# Patient Record
(demographics unavailable — no encounter records)

---

## 2025-01-14 NOTE — HISTORY OF PRESENT ILLNESS
[FreeTextEntry1] : overall feels well less active in winter, recent travel to [facility] some cp

## 2025-01-14 NOTE — DISCUSSION/SUMMARY
[FreeTextEntry1] : stable exam CAD/cp- h/o pci, recommend f/u for stress echo evaluation HTN stable on meds, discussed lifestyle Lipids stable on statin

## 2025-03-05 NOTE — HISTORY OF PRESENT ILLNESS
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  symptoms prior to cpap -  Patient is sleepy with [place] sleepiness score of 12.  Patient has very loud snoring which disturbs his  wife, does not have any witnessed apneas.  Patient's bedtime is around 10 PM wakes up in the morning around 4.45 AM.  He  feels rested when he  wakes up.  Patient drinks 3-4 cups of coffee during the daytime. Patient does not have any headaches , has nocturia 2-3 times.  he not is sleepy while driving.  symptoms on the cpap - Patient is not sleepy with [place] sleepiness score of 5.  Patient does not snore with cpap.  Patient's bedtime is around 10 PM wakes up in the morning around 5 AM.  He  feels rested when he  wakes up.  Patient drinks 3 cups of coffee during the daytime. Patient does not have any headaches , his nocturia improved.  He not is sleepy while driving.  download from the cpap on resmed machine dme regine uses nasal mask ahi 0.6 usage 6 hrs 40 min pressure 13 cm (8-15)  He has not seen PMD in a while and with all the medication he is taking, havent done blood work in a while will order cbc, basic met panel, hba1c, thyroid panel.

## 2025-03-05 NOTE — PHYSICAL EXAM
[General Appearance - Well Developed] : well developed [General Appearance - Well Nourished] : well nourished [Elongated Uvula] : elongated uvula [Enlarged Base of the Tongue] : enlargement of the base of the tongue [IV] : IV [Heart Sounds] : normal S1 and S2 [Murmurs] : no murmurs [] : no respiratory distress [Auscultation Breath Sounds / Voice Sounds] : lungs were clear to auscultation bilaterally [No Focal Deficits] : no focal deficits [Oriented To Time, Place, And Person] : oriented to person, place, and time [Memory Recent] : recent memory was not impaired

## 2025-03-05 NOTE — ASSESSMENT
[FreeTextEntry1] : 53 year  old man  with sleep apnea is doing well with the CPAP.  Patient is compliant with the CPAP and benefited significantly with the CPAP. Assessment: Obstructive Sleep Apnea (DOLLY): Well-controlled on CPAP therapy with AHI of 0.6, excellent adherence (6 hr 40 min/night), and significant symptom improvement ([place] score reduced from 12 to 5, resolution of snoring, improved nocturia). Current settings (pressure 13 cm HO, range 8-15 cm HO) appear effective. Coronary Artery Disease (CAD), s/p 4 stents: Stable per history, though no recent follow-up with primary care physician (PMD) or cardiologist noted. Pre-Diabetes: No recent HbA1c available to assess glycemic control. Hypertension (HTN): controlled on current medications. Overdue for Routine Monitoring: Patient has not seen PMD recently, and no recent blood work available despite multiple comorbidities and medications. Plan: DOLLY Management: Continue CPAP therapy with current settings (ResMed machine, nasal mask, pressure 8-15 cm HO, titrated to 13 cm HO). Follow-up in Sleep Center in 12 months or sooner if symptoms recur or CPAP issues arise. Encourage ongoing adherence; review CPAP data at next visit. Cardiovascular and Metabolic Health: Order labs to assess comorbidities and medication effects: Complete Blood Count (CBC) Basic Metabolic Panel (BMP) Hemoglobin A1c (HbA1c) Thyroid Function Panel (TSH, Free T4) Results to be reviewed by Sleep Center and forwarded to PMD.  Discussed benefits of CPAP compliance in reducing cardiovascular risk and improving quality of life.

## 2025-05-22 NOTE — DISCUSSION/SUMMARY
[FreeTextEntry1] : stable exam CAD stable, h/o pci, normal stress echo results discussed, continue secondary prevention HTN stable on meds Lipids stable on statin